# Patient Record
Sex: MALE | Race: WHITE | URBAN - METROPOLITAN AREA
[De-identification: names, ages, dates, MRNs, and addresses within clinical notes are randomized per-mention and may not be internally consistent; named-entity substitution may affect disease eponyms.]

---

## 2022-06-13 ENCOUNTER — OFFICE VISIT (OUTPATIENT)
Dept: URBAN - METROPOLITAN AREA CLINIC 85 | Facility: CLINIC | Age: 72
End: 2022-06-13
Payer: COMMERCIAL

## 2022-06-13 DIAGNOSIS — H25.13 AGE-RELATED NUCLEAR CATARACT, BILATERAL: Primary | ICD-10-CM

## 2022-06-13 DIAGNOSIS — H53.2 DIPLOPIA: ICD-10-CM

## 2022-06-13 DIAGNOSIS — H43.393 OTHER VITREOUS OPACITIES, BILATERAL: ICD-10-CM

## 2022-06-13 DIAGNOSIS — H35.361 DRUSEN (DEGENERATIVE) OF MACULA, RIGHT EYE: ICD-10-CM

## 2022-06-13 DIAGNOSIS — D48.5 NEOPLASM OF UNCERTAIN BEHAVIOR OF SKIN: ICD-10-CM

## 2022-06-13 PROCEDURE — 99204 OFFICE O/P NEW MOD 45 MIN: CPT | Performed by: OPTOMETRIST

## 2022-06-13 PROCEDURE — 92134 CPTRZ OPH DX IMG PST SGM RTA: CPT | Performed by: OPTOMETRIST

## 2022-06-13 ASSESSMENT — INTRAOCULAR PRESSURE
OD: 16
OS: 16

## 2022-06-13 ASSESSMENT — KERATOMETRY
OS: 43.50
OD: 43.50

## 2022-06-13 ASSESSMENT — VISUAL ACUITY
OS: 20/20
OD: 20/25

## 2022-06-13 NOTE — IMPRESSION/PLAN
Impression: Diplopia: H53.2. 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 See Dr. Radha Duarte for prism evaluation soon or RTC ASAP if pain, decreased VA, or any worsening of condition.

## 2022-06-13 NOTE — IMPRESSION/PLAN
Impression: Drusen (degenerative) of macula, right eye: H35.361. Plan: Macular drusen findings discussed with patient. Will continue to monitor vision and the patient has been instructed to call with any vision changes. Discussed AREDS II recommendations and studies showing potential slowing of progression. Consult with FMD to make sure there is no contraindication to AREDS II formulation use from their perspective. Pt. given written list of AREDS II formulation. Discussed home [address] [facility] (AG) monitoring QD and demonstrated use in office. Pt. given AG for home monitoring. Pt. instructed to call ASAP if acute VA change or change on AG, as that may indicate conversion to exudative macular degeneration.

## 2022-06-13 NOTE — IMPRESSION/PLAN
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 Plan: Discussed diagnosis in detail with patient. Discussed subtle asymmetry of right lateral aspect of bony structure of the nose relative to the left only noted on palpation. Not expected to be associated with other symptoms. Recommend consult with oculoplastic surgeon in next several months or RTC ASAP if pain, decreased VA, or any worsening of condition.

## 2022-06-13 NOTE — IMPRESSION/PLAN
Impression: Age-related nuclear cataract, bilateral: H25.13. Plan: Discussed findings. Discussed option of CE/IOL OU. Patient understands cataract effect on vision. Patient defers to have  CE w/IOL consult with Dr. Martina Lipscomb at this time. Continue to monitor. RTC 1 year CEE or ASAP if decreased VA or pain.